# Patient Record
Sex: FEMALE | Race: WHITE | NOT HISPANIC OR LATINO | URBAN - METROPOLITAN AREA
[De-identification: names, ages, dates, MRNs, and addresses within clinical notes are randomized per-mention and may not be internally consistent; named-entity substitution may affect disease eponyms.]

---

## 2025-06-03 ENCOUNTER — ANCILLARY PROCEDURE (OUTPATIENT)
Dept: URGENT CARE | Age: 70
End: 2025-06-03
Payer: MEDICARE

## 2025-06-03 ENCOUNTER — OFFICE VISIT (OUTPATIENT)
Dept: URGENT CARE | Age: 70
End: 2025-06-03
Payer: MEDICARE

## 2025-06-03 VITALS
HEART RATE: 79 BPM | TEMPERATURE: 98.2 F | OXYGEN SATURATION: 98 % | SYSTOLIC BLOOD PRESSURE: 144 MMHG | WEIGHT: 179 LBS | HEIGHT: 65 IN | RESPIRATION RATE: 16 BRPM | DIASTOLIC BLOOD PRESSURE: 81 MMHG | BODY MASS INDEX: 29.82 KG/M2

## 2025-06-03 DIAGNOSIS — M25.551 PAIN OF RIGHT HIP: Primary | ICD-10-CM

## 2025-06-03 DIAGNOSIS — M54.31 SCIATICA OF RIGHT SIDE: ICD-10-CM

## 2025-06-03 DIAGNOSIS — M25.551 PAIN OF RIGHT HIP: ICD-10-CM

## 2025-06-03 PROCEDURE — 72100 X-RAY EXAM L-S SPINE 2/3 VWS: CPT | Performed by: HOSPITALIST

## 2025-06-03 PROCEDURE — 73502 X-RAY EXAM HIP UNI 2-3 VIEWS: CPT | Mod: RIGHT SIDE | Performed by: HOSPITALIST

## 2025-06-03 RX ORDER — SEMAGLUTIDE 1.34 MG/ML
INJECTION, SOLUTION SUBCUTANEOUS
COMMUNITY
Start: 2025-05-22

## 2025-06-03 RX ORDER — ATORVASTATIN CALCIUM 40 MG/1
1 TABLET, FILM COATED ORAL
COMMUNITY
Start: 2025-04-25

## 2025-06-03 RX ORDER — PREDNISONE 20 MG/1
40 TABLET ORAL DAILY
Qty: 10 TABLET | Refills: 0 | Status: SHIPPED | OUTPATIENT
Start: 2025-06-03 | End: 2025-06-08

## 2025-06-03 RX ORDER — KETOROLAC TROMETHAMINE 30 MG/ML
30 INJECTION, SOLUTION INTRAMUSCULAR; INTRAVENOUS ONCE
Status: COMPLETED | OUTPATIENT
Start: 2025-06-03 | End: 2025-06-03

## 2025-06-03 RX ADMIN — KETOROLAC TROMETHAMINE 30 MG: 30 INJECTION, SOLUTION INTRAMUSCULAR; INTRAVENOUS at 10:19

## 2025-06-03 ASSESSMENT — ENCOUNTER SYMPTOMS: BACK PAIN: 1

## 2025-06-03 ASSESSMENT — PATIENT HEALTH QUESTIONNAIRE - PHQ9
1. LITTLE INTEREST OR PLEASURE IN DOING THINGS: NOT AT ALL
SUM OF ALL RESPONSES TO PHQ9 QUESTIONS 1 AND 2: 0
2. FEELING DOWN, DEPRESSED OR HOPELESS: NOT AT ALL

## 2025-06-03 ASSESSMENT — PAIN SCALES - GENERAL: PAINLEVEL_OUTOF10: 10-WORST PAIN EVER

## 2025-06-03 NOTE — PATIENT INSTRUCTIONS
Take prednisone  Try topical pain relief such as icy hot or salonpas  If you develop bowel or bladder issues go to the ed

## 2025-06-03 NOTE — PROGRESS NOTES
"Subjective   Patient ID: Suellen Cortes is a 69 y.o. female. They present today with a chief complaint of Back Pain (Going into butt and right leg since sunday).    History of Present Illness  Pt is a 69 year old female who presented with pain of right buttocks. This began on sat. She believed she overdid it at water aerobics a few days ago. She has no bowel or bladder issues. Pain rad to right buttocks  She is having difficulty walking due to pain      Back Pain        Past Medical History  Allergies as of 06/03/2025    (No Known Allergies)       Prescriptions Prior to Admission[1]     Medical History[2]    Surgical History[3]     reports that she has never smoked. She has never used smokeless tobacco.    Review of Systems  Review of Systems   Musculoskeletal:  Positive for back pain.                                  Objective    Vitals:    06/03/25 0916   BP: 144/81   BP Location: Left arm   Patient Position: Sitting   Pulse: 79   Resp: 16   Temp: 36.8 °C (98.2 °F)   SpO2: 98%   Weight: 81.2 kg (179 lb)   Height: 1.651 m (5' 5\")     No LMP recorded.    Physical Exam  Constitutional:       Appearance: Normal appearance.   Musculoskeletal:      Comments: Rom of right hip limited by pain but not limitied  No decrease in sensation or strength  Some tenderness over right hip   Neurological:      Mental Status: She is alert.         Procedures    Point of Care Test & Imaging Results from this visit  No results found for this visit on 06/03/25.   Imaging  No results found.    Cardiology, Vascular, and Other Imaging  No other imaging results found for the past 2 days      Diagnostic study results (if any) were reviewed by Gracia Weldon MD.    Assessment/Plan   Allergies, medications, history, and pertinent labs/EKGs/Imaging reviewed by Gracia Weldon MD.     Medical Decision Making  No fracture   Will treat for sciativca    Orders and Diagnoses  Diagnoses and all orders for this visit:  Pain of right hip  -     XR " lumbar spine 2-3 views; Future  -     XR hip right with pelvis when performed 2 or 3 views; Future      Medical Admin Record      Patient disposition: Home    Electronically signed by Gracia Weldon MD  9:28 AM           [1] (Not in a hospital admission)  [2] No past medical history on file.  [3] No past surgical history on file.